# Patient Record
Sex: MALE | URBAN - METROPOLITAN AREA
[De-identification: names, ages, dates, MRNs, and addresses within clinical notes are randomized per-mention and may not be internally consistent; named-entity substitution may affect disease eponyms.]

---

## 2018-02-28 ENCOUNTER — OFFICE VISIT (OUTPATIENT)
Dept: FAMILY MEDICINE | Facility: CLINIC | Age: 33
End: 2018-02-28
Payer: COMMERCIAL

## 2018-02-28 VITALS
HEIGHT: 69 IN | WEIGHT: 166 LBS | OXYGEN SATURATION: 99 % | DIASTOLIC BLOOD PRESSURE: 82 MMHG | BODY MASS INDEX: 24.59 KG/M2 | SYSTOLIC BLOOD PRESSURE: 118 MMHG | HEART RATE: 75 BPM

## 2018-02-28 DIAGNOSIS — M25.561 ACUTE PAIN OF RIGHT KNEE: Primary | ICD-10-CM

## 2018-02-28 DIAGNOSIS — M79.18 GLUTEAL PAIN: ICD-10-CM

## 2018-02-28 PROCEDURE — 99203 OFFICE O/P NEW LOW 30 MIN: CPT | Performed by: NURSE PRACTITIONER

## 2018-02-28 NOTE — NURSING NOTE
"Chief Complaint   Patient presents with     Musculoskeletal Problem       Initial /82 (BP Location: Right arm, Patient Position: Chair, Cuff Size: Adult Regular)  Pulse 75  Ht 5' 9\" (1.753 m)  Wt 166 lb (75.3 kg)  SpO2 99%  BMI 24.51 kg/m2 Estimated body mass index is 24.51 kg/(m^2) as calculated from the following:    Height as of this encounter: 5' 9\" (1.753 m).    Weight as of this encounter: 166 lb (75.3 kg).  Medication Reconciliation: complete     Shazia Beckford MA     "

## 2018-02-28 NOTE — MR AVS SNAPSHOT
After Visit Summary   2/28/2018    Florentino Bowser    MRN: [id number]           Patient Information     Date Of Birth          1985        Visit Information        Provider Department      2/28/2018 1:45 PM Madhuri Mcpherson NP [facility]        Today's Diagnoses     Acute pain of right knee    -  1    Gluteal pain           Follow-ups after your visit        Additional Services     SPORTS MEDICINE REFERRAL       Your provider has referred you to:  [facility]: Sports Medicine Clinic [facility] [phone]  Or [phone] [email or web address]    Please be aware that coverage of these services is subject to the terms and limitations of your health insurance plan.  Call member services at your health plan with any benefit or coverage questions.      Please bring the following to your appointment:    >>   Any x-rays, CTs or MRIs which have been performed.  Contact the facility where they were done to arrange for  prior to your scheduled appointment.    >>   List of current medications   >>   This referral request   >>   Any documents/labs given to you for this referral                  Who to contact     If you have questions or need follow up information about today's clinic visit or your schedule please contact [facility] directly at [phone].  Normal or non-critical lab and imaging results will be communicated to you by MyChart, letter or phone within 4 business days after the clinic has received the results. If you do not hear from us within 7 days, please contact the clinic through MyChart or phone. If you have a critical or abnormal lab result, we will notify you by phone as soon as possible.  Submit refill requests through [facility] or call your pharmacy and they will forward the refill request to us. Please allow 3 business days for your refill to be completed.          Additional Information About Your  "Visit        [employer] Information     [employer] lets you send messages to your doctor, view your test results, renew your prescriptions, schedule appointments and more. To sign up, go to [email or web address] . Click on \"Log in\" on the left side of the screen, which will take you to the Welcome page. Then click on \"Sign up Now\" on the right side of the page.     You will be asked to enter the access code listed below, as well as some personal information. Please follow the directions to create your username and password.     Your access code is: [email or web address]  Expires: 2018  2:26 PM     Your access code will  in 90 days. If you need help or a new code, please call your [facility] clinic or [phone].        Care EveryWhere ID     This is your Care EveryWhere ID. This could be used by other organizations to access your [facility] medical records  [id number]        Your Vitals Were     Pulse Height Pulse Oximetry BMI (Body Mass Index)          75 5' 9\" (1.753 m) 99% 24.51 kg/m2         Blood Pressure from Last 3 Encounters:   18 118/82    Weight from Last 3 Encounters:   18 166 lb (75.3 kg)              We Performed the Following     SPORTS MEDICINE REFERRAL        Primary Care Provider Fax #    Physician No Ref-Primary [phone]       No address on file        Equal Access to Services     NESSA MOTLEY : Hadii mandeep ku hadasho Soomaali, waaxda luqadaha, qaybta kaalmada yakov, edie martinez . So [facility] [phone].    ATENCIÓN: Si habla español, tiene a quintanilla disposición servicios gratuitos de asistencia lingüística. Sincere al [phone].    We comply with applicable federal civil rights laws and [place] laws. We do not discriminate on the basis of race, color, national origin, age, disability, sex, sexual orientation, or gender identity.            Thank you!     Thank you for choosing [facility]  for your care. Our goal is always to provide you " with excellent care. Hearing back from our patients is one way we can continue to improve our services. Please take a few minutes to complete the written survey that you may receive in the mail after your visit with us. Thank you!             Your Updated Medication List - Protect others around you: Learn how to safely use, store and throw away your medicines at www.disposemymeds.org.      Notice  As of 2/28/2018  2:26 PM    You have not been prescribed any medications.

## 2018-02-28 NOTE — PROGRESS NOTES
"  SUBJECTIVE:   Florentino Bowser is a 32 year old male who presents to clinic today for the following health issues:      Joint Pain    Onset: x 5 days     Description:   Location: right knee, medial  Character: Dull ache and pain varies with different movement on the right knee     Intensity: 4 or 5 /10 - constant     Progression of Symptoms: worse    Accompanying Signs & Symptoms:  Other symptoms: none    History:   Previous similar pain: No     Precipitating factors:   Trauma or overuse: no     Alleviating factors:  Improved by: nothing  He was playing soccer, went to kick the ball, his foot got   Pain is improving.  Feeling of weakness.  No swelling.    No history of knee surgery.     Therapies Tried and outcome: Advil - shows mild improvement     Pt states that he fear that he might have torn his MCL - Injured MCL during college     Six months ago, he injured his right quadricep. He took two months off of sports.   After his quad healed, he has continued to have pain right hip and top of quad- no improvement.  Pain is worse with driving.  Tried heat.  No back pain, radicular pain, paresthesias, weakness.         Problem list and histories reviewed & adjusted, as indicated.  Additional history: as documented        Reviewed and updated as needed this visit by clinical staff       Reviewed and updated as needed this visit by Provider         ROS:  CONSTITUTIONAL: NEGATIVE for fever, chills, change in weight  RESP: NEGATIVE for significant cough or SOB  CV: NEGATIVE for chest pain, palpitations or peripheral edema  MUSCULOSKELETAL: see HPI  NEURO: NEGATIVE for weakness, dizziness or paresthesias    OBJECTIVE:     /82 (BP Location: Right arm, Patient Position: Chair, Cuff Size: Adult Regular)  Pulse 75  Ht 5' 9\" (1.753 m)  Wt 166 lb (75.3 kg)  SpO2 99%  BMI 24.51 kg/m2  Body mass index is 24.51 kg/(m^2).  GENERAL: healthy, alert and no distress  MS: Right knee is normal to inspection, without erythema, " warmth or effusion.  Minimal medial joint line tenderness; range of motion is within normal limits.  No abnormal movement or tenderness of the patella.  Negative Drawer sign, Lachman's and Aydin's.  Ligaments are stable with varus and valgus pressure.  Tenderness at the gluteus medius insertion at the ilium and femur; no SI joint tenderness; FROM of the hip; negative SLR  SKIN: no suspicious lesions or rashes  NEURO: Normal strength and tone, mentation intact and speech normal        ASSESSMENT/PLAN:             1. Acute pain of right knee  MCL strain vs medial meniscus injury  Since symptoms are improving, will monitor.  Avoid soccer for a couple of weeks.  See ortho if symptoms are not improving.   - SPORTS MEDICINE REFERRAL    2. Gluteal pain  Chronic pain  Possible gluteus tendinopathy  Will refer to ortho.   - SPORTS MEDICINE REFERRAL        Madhuri Mcpherson NP  [facility]